# Patient Record
Sex: FEMALE | Race: WHITE | NOT HISPANIC OR LATINO | URBAN - METROPOLITAN AREA
[De-identification: names, ages, dates, MRNs, and addresses within clinical notes are randomized per-mention and may not be internally consistent; named-entity substitution may affect disease eponyms.]

---

## 2019-07-25 ENCOUNTER — APPOINTMENT (RX ONLY)
Dept: URBAN - METROPOLITAN AREA CLINIC 349 | Facility: CLINIC | Age: 84
Setting detail: DERMATOLOGY
End: 2019-07-25

## 2019-07-25 DIAGNOSIS — D485 NEOPLASM OF UNCERTAIN BEHAVIOR OF SKIN: ICD-10-CM

## 2019-07-25 DIAGNOSIS — L85.8 OTHER SPECIFIED EPIDERMAL THICKENING: ICD-10-CM

## 2019-07-25 PROBLEM — D48.5 NEOPLASM OF UNCERTAIN BEHAVIOR OF SKIN: Status: ACTIVE | Noted: 2019-07-25

## 2019-07-25 PROBLEM — I10 ESSENTIAL (PRIMARY) HYPERTENSION: Status: ACTIVE | Noted: 2019-07-25

## 2019-07-25 PROCEDURE — ? COUNSELING

## 2019-07-25 PROCEDURE — 99202 OFFICE O/P NEW SF 15 MIN: CPT | Mod: 25

## 2019-07-25 PROCEDURE — ? SHAVE REMOVAL

## 2019-07-25 PROCEDURE — 88305 TISSUE EXAM BY PATHOLOGIST: CPT

## 2019-07-25 PROCEDURE — A4550 SURGICAL TRAYS: HCPCS

## 2019-07-25 PROCEDURE — ? RECOMMENDATIONS

## 2019-07-25 PROCEDURE — 11306 SHAVE SKIN LESION 0.6-1.0 CM: CPT

## 2019-07-25 PROCEDURE — ? PATHOLOGY BILLING

## 2019-07-25 ASSESSMENT — LOCATION ZONE DERM
LOCATION ZONE: FEET
LOCATION ZONE: LEG
LOCATION ZONE: FEET

## 2019-07-25 ASSESSMENT — LOCATION DETAILED DESCRIPTION DERM
LOCATION DETAILED: RIGHT LATERAL DORSAL FOOT
LOCATION DETAILED: RIGHT LATERAL DORSAL FOOT
LOCATION DETAILED: RIGHT DORSAL FOOT
LOCATION DETAILED: LEFT ANKLE
LOCATION DETAILED: RIGHT LATERAL PLANTAR FOOT

## 2019-07-25 ASSESSMENT — LOCATION SIMPLE DESCRIPTION DERM
LOCATION SIMPLE: RIGHT FOOT
LOCATION SIMPLE: LEFT ANKLE
LOCATION SIMPLE: RIGHT FOOT

## 2019-07-25 NOTE — PROCEDURE: SHAVE REMOVAL
Accession #: [id number]
Billing Type: Third-Party Bill
Medical Necessity Information: It is in your best interest to select a reason for this procedure from the list below. All of these items fulfill various CMS LCD requirements except the new and changing color options.
Add Variable For Additional Medical Justification: No
Notification Instructions: Patient will be notified of biopsy results. However, patient instructed to call the office if not contacted within 2 weeks.
Medical Necessity Clause: This procedure was medically necessary because the lesion that was treated was: irritated and two tone color
Biopsy Method: Personna blade
Bill For Surgical Tray: yes
Post-Care Instructions: I reviewed with the patient in detail post-care instructions. Patient is to keep the biopsy site dry overnight, and then apply bacitracin twice daily until healed. Patient may apply hydrogen peroxide soaks to remove any crusting.
Size Of Lesion In Cm (Required): 0.9
Anesthesia Type: 1% lidocaine with epinephrine and a 1:10 solution of 8.4% sodium bicarbonate
X Size Of Lesion In Cm (Optional): 0
Anesthesia Volume In Cc: 0.6
Wound Care: Vaseline
Hemostasis: Aluminum Chloride
Consent was obtained from the patient. The risks and benefits to therapy were discussed in detail. Specifically, the risks of infection, scarring, bleeding, prolonged wound healing, incomplete removal, allergy to anesthesia, nerve injury and recurrence were addressed. Prior to the procedure, the treatment site was clearly identified and confirmed by the patient. All components of Universal Protocol/PAUSE Rule completed.
Detail Level: Detailed

## 2019-07-25 NOTE — HPI: SKIN LESIONS
How Severe Is Your Skin Lesion?: mild
Have Your Skin Lesions Been Treated?: not been treated
Is This A New Presentation, Or A Follow-Up?: Skin Lesions
Additional History: PCP treated with Mupirocin and urea lotion

## 2019-07-25 NOTE — PROCEDURE: PATHOLOGY BILLING
Immunohistochemistry ([id number] [id number]) billing is not performed here. Please use the Immunohistochemistry Stain Billing plan to accomplish this. Immunohistochemistry ([id number] and [id number]) billing is not performed here. Please use the Immunohistochemistry Stain Billing plan to accomplish this.

## 2021-01-01 ENCOUNTER — HOME CARE VISIT (OUTPATIENT)
Dept: HOSPICE | Facility: HOSPICE | Age: 86
End: 2021-01-01
Payer: MEDICARE

## 2021-01-01 ENCOUNTER — HOME CARE VISIT (OUTPATIENT)
Dept: SCHEDULING | Facility: HOME HEALTH | Age: 86
End: 2021-01-01
Payer: MEDICARE

## 2021-01-01 ENCOUNTER — HOSPICE ADMISSION (OUTPATIENT)
Dept: HOSPICE | Facility: HOSPICE | Age: 86
End: 2021-01-01
Payer: MEDICARE

## 2021-01-01 VITALS
TEMPERATURE: 98 F | HEART RATE: 84 BPM | DIASTOLIC BLOOD PRESSURE: 80 MMHG | RESPIRATION RATE: 16 BRPM | SYSTOLIC BLOOD PRESSURE: 140 MMHG

## 2021-01-01 VITALS
HEART RATE: 80 BPM | SYSTOLIC BLOOD PRESSURE: 90 MMHG | TEMPERATURE: 98.8 F | DIASTOLIC BLOOD PRESSURE: 60 MMHG | RESPIRATION RATE: 20 BRPM

## 2021-01-01 VITALS
TEMPERATURE: 97.9 F | SYSTOLIC BLOOD PRESSURE: 130 MMHG | DIASTOLIC BLOOD PRESSURE: 80 MMHG | HEART RATE: 92 BPM | RESPIRATION RATE: 28 BRPM

## 2021-01-01 VITALS
HEIGHT: 60 IN | SYSTOLIC BLOOD PRESSURE: 132 MMHG | WEIGHT: 126 LBS | BODY MASS INDEX: 24.74 KG/M2 | TEMPERATURE: 97.9 F | HEART RATE: 82 BPM | RESPIRATION RATE: 19 BRPM | DIASTOLIC BLOOD PRESSURE: 80 MMHG

## 2021-01-01 VITALS
HEART RATE: 108 BPM | TEMPERATURE: 98.8 F | RESPIRATION RATE: 22 BRPM | DIASTOLIC BLOOD PRESSURE: 100 MMHG | SYSTOLIC BLOOD PRESSURE: 120 MMHG

## 2021-01-01 VITALS
HEART RATE: 80 BPM | SYSTOLIC BLOOD PRESSURE: 120 MMHG | DIASTOLIC BLOOD PRESSURE: 80 MMHG | TEMPERATURE: 99.4 F | RESPIRATION RATE: 28 BRPM

## 2021-01-01 PROCEDURE — 0651 HSPC ROUTINE HOME CARE

## 2021-01-01 PROCEDURE — G0155 HHCP-SVS OF CSW,EA 15 MIN: HCPCS

## 2021-01-01 PROCEDURE — G0299 HHS/HOSPICE OF RN EA 15 MIN: HCPCS

## 2021-01-01 PROCEDURE — G0156 HHCP-SVS OF AIDE,EA 15 MIN: HCPCS

## 2021-01-01 PROCEDURE — HOSPICE MEDICATION HC HH HOSPICE MEDICATION

## 2021-01-01 PROCEDURE — 3331090004 HSPC SERVICE INTENSITY ADD-ON

## 2021-01-01 PROCEDURE — 3336500001 HSPC ELECTION

## 2021-08-03 PROBLEM — I10 HYPERTENSION: Status: RESOLVED | Noted: 2021-01-01 | Resolved: 2021-01-01

## 2021-12-03 NOTE — HOSPICE
Pt is lying in her bed propped up with pillows. Pt alert, oriented x 2. Pt confuses easily but is pleasant. Lung sounds decreased, on room air now, has 02 concentrator, gave instructions to son and DIL for oxygen use when pt is dyspneic. Pt has a small to poor appetite this week. No nausea noted. BMs are regular twice daily and loose. Pt does not get out of bed this week, family plans to transfer pt to hospital bed after Gel Overlay for mattress is delivered from [facility]. To be delivered this evening around 8 pm.   Left supplies for pt as follows: Lip balm, 1 underpads, 1 L briefs, mouth swabs. Checked with Vilma More about Comfort shaw, pt was listed as discharged, Tech reinstated account as active, and will send Comfort Shaw after hardcopy sent by [facility] office. Family agree with plan of care and are appreciative of hospice services.

## 2021-12-03 NOTE — HOSPICE
Patient is a 8 year old female being admitted to [facility] with a primary dx of Hypertensive Heart Disease with Congestive Heart Failure. KPS/PPS 20. NYHA 4. Patient resides in downstairs suite of her son and daughter-in-laws home. Son, Evelyne Hernandez, is HCPOA; copies of ppw will need to be obtained for patient chart. Betty Zimmer signed Braulio Zurita and patient DNR with MSW, Jamari Knowles. Patient screened negative for COVID-19 on 12/2/2021 and has received COVID-19 vaccination x 2 doses. Per family, patient has had a drastic decline over the last several weeks. She has gone from ambulating independently with walker and supervision to non-ambulatory and bedbound. Appetite has progressively declined, and patient may only eat 1/4 grilled cheese sandwich or 1/4 cup of fruit. She does not request food or fluids and will only eat or drink with encouragement. Patient has become increasingly fatigued. She also has lost all core body strength and is unable to hold herself in an upright position. She was continent of bowel and bladder and able to toilet self until this past week; she has now lost all control of bowel and bladder per family. Eliquis d/c on admission r/t patient hx of falls and risk for bleeding. She will continue Toprol XL 100mg daily. Patient does not have any pain per family. PAINAD 0/10. No pain medication needed at this time. Patient appropriate for hospice services at this time. RN and MSW educated family on hospice process and philosophy, medication management, pain control, skin care and protection, fall precautions, anxiety strategies, home safety, and social distancing due to COVID-19. Pt history, assessment, status reviewed with Dr. Erna Henao. Patient's medications reconciled with this RN and Shayla SHEEHAN. Family made aware of volunteer services but pending at this time due to COVID-19; plans to reassess volunteer needs once volunteer services become available.  Patient is appropriate for hospice services at this time. HHA requested 2x weekly for ADL support. SW and SC services accepted. Caregiver made aware that an RN will be completing a follow-up visit within 24 hours. 19 [employer] [place] [facility] [place] book with 19 [employer] phone number left in home. Family has been educated to call [employer] 24/7 phone line with any changes, concerns, or falls with verbalized understanding.      Medications: Comfort Shaw ordered from [place]    DME: Full electric hospital bed, Over Bed Table, and Oxygen Concentrator ordered for delivery by [facility] on 12/3/2021    Supplies: Incontinence supplies will need to be ordered by RN on 24h follow up visit

## 2021-12-06 NOTE — HOSPICE
Joint visit with admission RN. Intiial SWA completed and returned later in the day to sign consents. Pt is a 80 yr old female, admitted with hypertensive heart disease and CHF. Pt lives with her son Bart Sage and GRIFFIN Jones. Pt has one other living son in [place] and a third son recently passed away. Bart Sage' children live out of state. Pt lives in basement apartment and was mostly independent until last week. Son reports pt has had a drastic decline in the last week and is now bed bound and eating little. Son states pt does not ask for food, but does continue to ask for fluid. Pt is incontinent. Pt is confused and son reports occasionally hallucinates. Pt does not report pain. Bart Sage and Judd Jones are very supportive and engaged and are pt's only caregivers. SW provided information on available resources and son and DIL confirm understanding. No other SW needs indicated.

## 2021-12-07 NOTE — HOSPICE
Supplies ordered: 2 L briefs, 2 underpads, 2 wet wipes, 5 5x5 optifoam,   No med refills needed. Pt is lying in bed on her right side sleeping comfortably. RN performed assessment and pt remained asleep throughout the visit. Pt appears comfortable, noted respirations between 24-28. Discussed use of oxygen for comfort, discussed comfort meds with son Mary Ulloa. Pt is eating very little, perhaps 3-4 grapes, and a 1/4 cup chicken soup broth. Denys Madera stated pt was sleeping 18 hours per day 4 days ago, today they state pt is sleeping at least 20 hours per day. Education given for watching for nonverbal signs of pain with verbalized understanding of Mary Vincejason and Margregine Meth. Education given about decline of pt, s/sx to watch for.

## 2021-12-15 NOTE — HOSPICE
Discusssed s/sx of dying process due to pt has not eaten x 4 days. Pt slept for 24 hours straight per son Meche De La Cruz. Pt also having hallucinations, some frightful, some not. Instructed on use of Haldol. Pt is becoming hot then cold. Pt is reaching toward ceiling during sleep. Temp 99.4, and resp 28-30. Suggested admin of Roxanol, and future instructions for Roxanol given. RN to call family tomorrow for further support and provide visit if needed.

## 2021-12-15 NOTE — HOSPICE
Visit for decline. Pt's grandson visiting at this time and he is with her during SW visit. CNA also arrives to bathe pt. SW spoke with son Jaida Salas and DIL Kevinflynn Soriano and provided listening and support. Hi Soriano reports pt has been consistently declining and has been very confused and agitated. Jaida Salas and Hi Soriano agree that they arer able to control her symptoms adequately with current med regimen. Hi Soriano reports they are up a lot at night with her and they are very tired. Jyothi Lopez' kids are visiting this week and can provide support whilethey are here. Hi Soriano states she does not expect pt to live much longer. \"Gone From My Sight\" provided. No other SW needs at this time.

## 2021-12-17 NOTE — HOSPICE
Pt is sitting up in bed, alert during visit, family states pt is not eating or drinking now x 6 days, sips of fluids but pt chokes on sips. Pt is sleeping most of the day and night. Small area of redness to sacrum, cleansed and placed barrier cream and assisted in changing brief. New areas of redness noted on buttocks, and right hip. Pt was exhausted after brief change and respirations were labored, and increased. Family gave Roxanol to alleviate pain and dyspnea. Pt was moving around alot on the bed and repeatedly asked to sit up further in bed even thouigh she was already sitting up straight. Discussed possible terminal agitation with family who agree to administer Haldol if needed. Gave Roxanol prn instructions and repeated indications for such.

## 2021-12-18 NOTE — HOSPICE
pts family called and stated that pt was not breathing. sn arrived. pt was absent of vital signs. pt was verified that pt had  at  1030 am. Douglas Ramirez was notified of pts death. pts family was present and coping well. sn called [facility] Kim / Marita Dawn  home. sn called [facility] to have equipment picked up. Sn bathed pt. Family refused the need of a  or  today. all meds were disposed of by sn and patients family using a disopozo bag. Email sent to [facility] staff about pts death. [facility]  was faxed notification of pts death. Instructed family to call [facility] with any questions.

## 2021-12-18 NOTE — HOSPICE
daily nurse visits due to pt decline and family support. pt is lying in her bed unresponsive. pt appears to be transitioning. Pt is bedbound. pts son & dil are present. Pt wears o2  PRN. Pts Appetite is  none       Pt has no s&s of pain today. pts son is giving pt roxanol prn for s&s of distress. pt appears calm with s&s controlled. Sn reviewed pts meds. Refilled meds: none needed  Ordered supplies: none needed    SN updated mar & reconciled meds. sn updated care plan. Instructed pts cg to call [facility] with any questions or concerns. Pts cg verbalized understanding and agreement. sn will see pt again tomorrow.